# Patient Record
Sex: MALE | Race: WHITE | HISPANIC OR LATINO
[De-identification: names, ages, dates, MRNs, and addresses within clinical notes are randomized per-mention and may not be internally consistent; named-entity substitution may affect disease eponyms.]

---

## 2018-05-03 ENCOUNTER — TRANSCRIPTION ENCOUNTER (OUTPATIENT)
Age: 48
End: 2018-05-03

## 2021-12-20 PROBLEM — Z00.00 ENCOUNTER FOR PREVENTIVE HEALTH EXAMINATION: Status: ACTIVE | Noted: 2021-12-20

## 2023-05-14 ENCOUNTER — FORM ENCOUNTER (OUTPATIENT)
Age: 53
End: 2023-05-14

## 2023-05-15 ENCOUNTER — APPOINTMENT (OUTPATIENT)
Dept: MRI IMAGING | Facility: CLINIC | Age: 53
End: 2023-05-15
Payer: COMMERCIAL

## 2023-05-15 ENCOUNTER — APPOINTMENT (OUTPATIENT)
Dept: ORTHOPEDIC SURGERY | Facility: CLINIC | Age: 53
End: 2023-05-15
Payer: COMMERCIAL

## 2023-05-15 VITALS — HEIGHT: 68 IN | WEIGHT: 220 LBS | BODY MASS INDEX: 33.34 KG/M2

## 2023-05-15 DIAGNOSIS — I10 ESSENTIAL (PRIMARY) HYPERTENSION: ICD-10-CM

## 2023-05-15 DIAGNOSIS — Z78.9 OTHER SPECIFIED HEALTH STATUS: ICD-10-CM

## 2023-05-15 PROCEDURE — 73721 MRI JNT OF LWR EXTRE W/O DYE: CPT | Mod: RT

## 2023-05-15 PROCEDURE — 99203 OFFICE O/P NEW LOW 30 MIN: CPT

## 2023-05-15 PROCEDURE — 73590 X-RAY EXAM OF LOWER LEG: CPT | Mod: RT

## 2023-05-15 PROCEDURE — 73610 X-RAY EXAM OF ANKLE: CPT | Mod: RT

## 2023-05-15 RX ORDER — LOSARTAN POTASSIUM 50 MG/1
50 TABLET, FILM COATED ORAL
Refills: 0 | Status: ACTIVE | COMMUNITY

## 2023-05-15 NOTE — HISTORY OF PRESENT ILLNESS
[10] : 10 [7] : 7 [Localized] : localized [Sharp] : sharp [Full time] : Work status: full time [de-identified] : 5/15/23: 53YO M WITH RIGHT POSTERIOR ANKLE PAIN SINCE YESTERDAY, 5/14/23, AFTER HE HAD BEEN PLAYING HANDBALL. STATES HE FELT "AS IF SOMEONE HIT THE BACK OF HIS ANKLE WITH A ROCK" AND FELT A SNAP. HE WAS INITIALLY TREATED AT [facility] AND WAS PLACED IN A CAM BOOT AND CRUTCHES.  [] : Post Surgical Visit: no [FreeTextEntry1] : HORACE Jenkins [FreeTextEntry3] : 5/15/23 [FreeTextEntry5] : 51 y/o RHD M eval R Achilles s/p traumatic rupture while playing  handball yesterday as per [facility] ED on DOI.  [de-identified] : XR\par US [de-identified] : Sophia  [de-identified] : d

## 2023-05-15 NOTE — ASSESSMENT
[FreeTextEntry1] : Achilles tendon rupture after playing handball.  Advised a stat MRI and already in boot from the emergency room.  Added heel wedge to the boot.  To see Manny after MRI to discuss surgical repair.

## 2023-05-16 ENCOUNTER — TRANSCRIPTION ENCOUNTER (OUTPATIENT)
Age: 53
End: 2023-05-16

## 2023-05-17 ENCOUNTER — NON-APPOINTMENT (OUTPATIENT)
Age: 53
End: 2023-05-17

## 2023-05-17 ENCOUNTER — APPOINTMENT (OUTPATIENT)
Dept: ORTHOPEDIC SURGERY | Facility: CLINIC | Age: 53
End: 2023-05-17
Payer: COMMERCIAL

## 2023-05-17 VITALS — BODY MASS INDEX: 33.34 KG/M2 | WEIGHT: 220 LBS | HEIGHT: 68 IN

## 2023-05-17 DIAGNOSIS — S86.011A STRAIN OF RIGHT ACHILLES TENDON, INITIAL ENCOUNTER: ICD-10-CM

## 2023-05-17 PROCEDURE — 99214 OFFICE O/P EST MOD 30 MIN: CPT

## 2023-05-17 NOTE — HISTORY OF PRESENT ILLNESS
[10] : 10 [7] : 7 [Sharp] : sharp [de-identified] : 05/17/2023: injury 5/14 playing handball. saw dr traore who sent for mri. currently nwb in boot. no prior sig ankle probs. denies dm/tob. janelleman [] : Post Surgical Visit: no [FreeTextEntry1] : RT ankle

## 2023-05-17 NOTE — PHYSICAL EXAM
[Right] : right foot and ankle [Mild] : mild swelling over achilles tendon [5___] : [facility] 5[unfilled]/5 [2+] : dorsalis pedis pulse: 2+ [] : ambulation with crutches [FreeTextEntry9] : limited by pain [de-identified] : abnormal núñez

## 2023-05-17 NOTE — DATA REVIEWED
[Outside X-rays] : outside x-rays [Lower Extremities] : lower extremities [MRI] : MRI [Right] : of the right [Ankle] : ankle [I reviewed the films/CD and additionally noted] : I reviewed the films/CD and additionally noted [FreeTextEntry1] : no acute fx [FreeTextEntry2] : achilles rupture

## 2023-05-17 NOTE — ASSESSMENT
[FreeTextEntry1] : nwb in cam boot\par dsicussed surgical and non surgical options\par discussed pros/cons for both and expected recovery\par all questions answered and would like to proceed with achilles repair and posterior fasciotomy\par \par The pros, cons, risks, benefits, and alternatives have been discussed.  The risks include but are not limited to infection, bleeding, injury to small nerves and blood vessels, pain, stiffness, progression, rerupture, dvt, PE, amputation and death. All the patient's questions were answered and they would like to proceed.\par

## 2023-05-23 ENCOUNTER — OUTPATIENT (OUTPATIENT)
Dept: OUTPATIENT SERVICES | Facility: HOSPITAL | Age: 53
LOS: 1 days | End: 2023-05-23
Payer: COMMERCIAL

## 2023-05-23 VITALS
HEART RATE: 96 BPM | DIASTOLIC BLOOD PRESSURE: 80 MMHG | SYSTOLIC BLOOD PRESSURE: 120 MMHG | TEMPERATURE: 98 F | RESPIRATION RATE: 14 BRPM | OXYGEN SATURATION: 99 % | HEIGHT: 67 IN | WEIGHT: 225.97 LBS

## 2023-05-23 DIAGNOSIS — S86.019A STRAIN OF UNSPECIFIED ACHILLES TENDON, INITIAL ENCOUNTER: ICD-10-CM

## 2023-05-23 DIAGNOSIS — Z01.818 ENCOUNTER FOR OTHER PREPROCEDURAL EXAMINATION: ICD-10-CM

## 2023-05-23 DIAGNOSIS — S86.011A STRAIN OF RIGHT ACHILLES TENDON, INITIAL ENCOUNTER: ICD-10-CM

## 2023-05-23 LAB
ANION GAP SERPL CALC-SCNC: 10 MMOL/L — SIGNIFICANT CHANGE UP (ref 5–17)
BUN SERPL-MCNC: 15 MG/DL — SIGNIFICANT CHANGE UP (ref 7–23)
CALCIUM SERPL-MCNC: 9.4 MG/DL — SIGNIFICANT CHANGE UP (ref 8.4–10.5)
CHLORIDE SERPL-SCNC: 105 MMOL/L — SIGNIFICANT CHANGE UP (ref 96–108)
CO2 SERPL-SCNC: 26 MMOL/L — SIGNIFICANT CHANGE UP (ref 22–31)
CREAT SERPL-MCNC: 0.99 MG/DL — SIGNIFICANT CHANGE UP (ref 0.5–1.3)
EGFR: 91 ML/MIN/1.73M2 — SIGNIFICANT CHANGE UP
GLUCOSE SERPL-MCNC: 96 MG/DL — SIGNIFICANT CHANGE UP (ref 70–99)
HCT VFR BLD CALC: 45.5 % — SIGNIFICANT CHANGE UP (ref 39–50)
HGB BLD-MCNC: 15.8 G/DL — SIGNIFICANT CHANGE UP (ref 13–17)
MCHC RBC-ENTMCNC: 31.3 PG — SIGNIFICANT CHANGE UP (ref 27–34)
MCHC RBC-ENTMCNC: 34.7 GM/DL — SIGNIFICANT CHANGE UP (ref 32–36)
MCV RBC AUTO: 90.1 FL — SIGNIFICANT CHANGE UP (ref 80–100)
NRBC # BLD: 0 /100 WBCS — SIGNIFICANT CHANGE UP (ref 0–0)
PLATELET # BLD AUTO: 224 K/UL — SIGNIFICANT CHANGE UP (ref 150–400)
POTASSIUM SERPL-MCNC: 4 MMOL/L — SIGNIFICANT CHANGE UP (ref 3.5–5.3)
POTASSIUM SERPL-SCNC: 4 MMOL/L — SIGNIFICANT CHANGE UP (ref 3.5–5.3)
RBC # BLD: 5.05 M/UL — SIGNIFICANT CHANGE UP (ref 4.2–5.8)
RBC # FLD: 12.6 % — SIGNIFICANT CHANGE UP (ref 10.3–14.5)
SODIUM SERPL-SCNC: 141 MMOL/L — SIGNIFICANT CHANGE UP (ref 135–145)
WBC # BLD: 6.72 K/UL — SIGNIFICANT CHANGE UP (ref 3.8–10.5)
WBC # FLD AUTO: 6.72 K/UL — SIGNIFICANT CHANGE UP (ref 3.8–10.5)

## 2023-05-23 PROCEDURE — 80048 BASIC METABOLIC PNL TOTAL CA: CPT

## 2023-05-23 PROCEDURE — G0463: CPT

## 2023-05-23 PROCEDURE — 85027 COMPLETE CBC AUTOMATED: CPT

## 2023-05-23 PROCEDURE — 93010 ELECTROCARDIOGRAM REPORT: CPT

## 2023-05-23 PROCEDURE — 36415 COLL VENOUS BLD VENIPUNCTURE: CPT

## 2023-05-23 PROCEDURE — 93005 ELECTROCARDIOGRAM TRACING: CPT

## 2023-05-23 NOTE — H&P PST ADULT - MUSCULOSKELETAL COMMENTS
right leg on NWB in boot , tender on palpation right achilles tendon rupture right leg on NWB in boot ,right ankle  tender on palpation, no paresthesia

## 2023-05-23 NOTE — H&P PST ADULT - PROBLEM SELECTOR PLAN 1
scheduled for right achilles tendon repair on 5/31/23  will obtain medical clearance   pre op instructions   PCP needs to address EKG in the clearance

## 2023-05-23 NOTE — H&P PST ADULT - HISTORY OF PRESENT ILLNESS
This is a 51 y/o male who injured his right ankle while playing hand ball on 5/14/23 presents with complaint of right ankle pain , swelling NAW in boot went to ER  This is a 51 y/o male who injured his right ankle while playing hand ball on 5/14/23 presents with complaint of right ankle pain ,swelling NWB  in boot . scheduled for right achilles repair  on 5/31/23

## 2023-05-24 RX ORDER — CHLORHEXIDINE GLUCONATE 213 G/1000ML
1 SOLUTION TOPICAL ONCE
Refills: 0 | Status: DISCONTINUED | OUTPATIENT
Start: 2023-05-26 | End: 2023-06-09

## 2023-05-25 ENCOUNTER — TRANSCRIPTION ENCOUNTER (OUTPATIENT)
Age: 53
End: 2023-05-25

## 2023-05-26 ENCOUNTER — TRANSCRIPTION ENCOUNTER (OUTPATIENT)
Age: 53
End: 2023-05-26

## 2023-05-26 ENCOUNTER — APPOINTMENT (OUTPATIENT)
Dept: ORTHOPEDIC SURGERY | Facility: HOSPITAL | Age: 53
End: 2023-05-26
Payer: COMMERCIAL

## 2023-05-26 ENCOUNTER — OUTPATIENT (OUTPATIENT)
Dept: OUTPATIENT SERVICES | Facility: HOSPITAL | Age: 53
LOS: 1 days | End: 2023-05-26
Payer: COMMERCIAL

## 2023-05-26 VITALS
TEMPERATURE: 98 F | WEIGHT: 217.82 LBS | RESPIRATION RATE: 16 BRPM | OXYGEN SATURATION: 96 % | DIASTOLIC BLOOD PRESSURE: 95 MMHG | SYSTOLIC BLOOD PRESSURE: 125 MMHG | HEART RATE: 88 BPM | HEIGHT: 68 IN

## 2023-05-26 VITALS
HEART RATE: 75 BPM | OXYGEN SATURATION: 96 % | SYSTOLIC BLOOD PRESSURE: 128 MMHG | DIASTOLIC BLOOD PRESSURE: 80 MMHG | RESPIRATION RATE: 20 BRPM

## 2023-05-26 DIAGNOSIS — S86.011A STRAIN OF RIGHT ACHILLES TENDON, INITIAL ENCOUNTER: ICD-10-CM

## 2023-05-26 PROCEDURE — 27650 REPAIR ACHILLES TENDON: CPT | Mod: AS,RT

## 2023-05-26 PROCEDURE — C1713: CPT

## 2023-05-26 PROCEDURE — 97161 PT EVAL LOW COMPLEX 20 MIN: CPT

## 2023-05-26 PROCEDURE — 99261: CPT

## 2023-05-26 PROCEDURE — 27650 REPAIR ACHILLES TENDON: CPT | Mod: RT

## 2023-05-26 PROCEDURE — 27601 DECOMPRESSION OF LOWER LEG: CPT | Mod: 59,RT

## 2023-05-26 PROCEDURE — 29515 APPLICATION SHORT LEG SPLINT: CPT | Mod: 59,RT

## 2023-05-26 PROCEDURE — 27652 REPAIR/GRAFT ACHILLES TENDON: CPT | Mod: RT

## 2023-05-26 DEVICE — IMP SYS MIDSUBSTANCE ACHILLES SPEEDBRIDGE: Type: IMPLANTABLE DEVICE | Site: RIGHT | Status: FUNCTIONAL

## 2023-05-26 RX ORDER — ASPIRIN/CALCIUM CARB/MAGNESIUM 324 MG
1 TABLET ORAL
Qty: 30 | Refills: 0
Start: 2023-05-26

## 2023-05-26 RX ORDER — OXYCODONE HYDROCHLORIDE 5 MG/1
5 TABLET ORAL ONCE
Refills: 0 | Status: DISCONTINUED | OUTPATIENT
Start: 2023-05-26 | End: 2023-05-26

## 2023-05-26 RX ORDER — APREPITANT 80 MG/1
40 CAPSULE ORAL ONCE
Refills: 0 | Status: DISCONTINUED | OUTPATIENT
Start: 2023-05-26 | End: 2023-05-26

## 2023-05-26 RX ORDER — CEFAZOLIN SODIUM 1 G
2000 VIAL (EA) INJECTION ONCE
Refills: 0 | Status: COMPLETED | OUTPATIENT
Start: 2023-05-26 | End: 2023-05-26

## 2023-05-26 RX ORDER — HYDROMORPHONE HYDROCHLORIDE 2 MG/ML
0.5 INJECTION INTRAMUSCULAR; INTRAVENOUS; SUBCUTANEOUS
Refills: 0 | Status: DISCONTINUED | OUTPATIENT
Start: 2023-05-26 | End: 2023-05-26

## 2023-05-26 RX ORDER — HYDROCODONE BITARTRATE AND ACETAMINOPHEN 7.5; 325 MG/15ML; MG/15ML
1 SOLUTION ORAL
Qty: 20 | Refills: 0
Start: 2023-05-26

## 2023-05-26 RX ORDER — LOSARTAN POTASSIUM 100 MG/1
1 TABLET, FILM COATED ORAL
Refills: 0 | DISCHARGE

## 2023-05-26 RX ORDER — ONDANSETRON 8 MG/1
4 TABLET, FILM COATED ORAL ONCE
Refills: 0 | Status: DISCONTINUED | OUTPATIENT
Start: 2023-05-26 | End: 2023-05-26

## 2023-05-26 RX ORDER — SODIUM CHLORIDE 9 MG/ML
1000 INJECTION, SOLUTION INTRAVENOUS
Refills: 0 | Status: DISCONTINUED | OUTPATIENT
Start: 2023-05-26 | End: 2023-05-26

## 2023-05-26 RX ADMIN — SODIUM CHLORIDE 75 MILLILITER(S): 9 INJECTION, SOLUTION INTRAVENOUS at 12:22

## 2023-05-26 NOTE — PHYSICAL THERAPY INITIAL EVALUATION ADULT - NSACTIVITYREC_GEN_A_PT
Pt independent in transfers and amb with crutches, pt edu on stair negotiation simulation with crutches. Pt edu on NWB precautions. Pt issued crutches, adjusted to appropriate height. Pt issued NWB education handout. Pt cleared from PT services, no skilled need upon d/c.

## 2023-05-26 NOTE — PHYSICAL THERAPY INITIAL EVALUATION ADULT - RANGE OF MOTION EXAMINATION, REHAB EVAL
R ankle impairment secondary to surgery/bilateral upper extremity ROM was WFL (within functional limits)/Left LE ROM was WFL (within functional limits)/deficits as listed below

## 2023-05-26 NOTE — PHYSICAL THERAPY INITIAL EVALUATION ADULT - ADDITIONAL COMMENTS
Pt resides in pvt home with spouse and childre. Pt states is able to enter through back door without any stairs, denies stairs within household. Pt has one crutch, stating was using one crutch prior to surgery.

## 2023-05-26 NOTE — ASU PATIENT PROFILE, ADULT - FALL HARM RISK - UNIVERSAL INTERVENTIONS
Bed in lowest position, wheels locked, appropriate side rails in place/Call bell, personal items and telephone in reach/Instruct patient to call for assistance before getting out of bed or chair/Non-slip footwear when patient is out of bed/[place] to call system/Physically safe environment - no spills, clutter or unnecessary equipment/Purposeful Proactive Rounding/Room/bathroom lighting operational, light cord in reach

## 2023-05-26 NOTE — ASU DISCHARGE PLAN (ADULT/PEDIATRIC) - CARE PROVIDER_API CALL
Víctor Bryant  Orthopaedic Surgery  [address] [place] [address]  Phone: [phone]  Fax: [phone]  Follow Up Time: 1 week

## 2023-06-07 ENCOUNTER — APPOINTMENT (OUTPATIENT)
Dept: ORTHOPEDIC SURGERY | Facility: CLINIC | Age: 53
End: 2023-06-07
Payer: COMMERCIAL

## 2023-06-07 PROBLEM — I10 ESSENTIAL (PRIMARY) HYPERTENSION: Chronic | Status: ACTIVE | Noted: 2023-05-23

## 2023-06-07 PROCEDURE — 99024 POSTOP FOLLOW-UP VISIT: CPT

## 2023-06-07 NOTE — HISTORY OF PRESENT ILLNESS
[10] : 10 [7] : 7 [Sharp] : sharp [de-identified] : 05/17/2023: injury 5/14 playing handball. saw dr traore who sent for mri. currently nwb in boot. no prior sig ankle probs. denies dm/tob. doorman\par \par 5/26/2023:  R achilles repair deep posterior fasciotomy \par \par 06/07/2023:  no complaints. nwb in splint. taking asa. Patient denies fevers, chills, or drainage.\par  [] : Post Surgical Visit: no [FreeTextEntry1] : RT ankle

## 2023-06-07 NOTE — PHYSICAL EXAM
[Right] : right foot and ankle [5___] : [facility] 5[unfilled]/5 [2+] : dorsalis pedis pulse: 2+ [] : Sensation present to light touch in all distributions [de-identified] : rollator

## 2023-06-21 ENCOUNTER — APPOINTMENT (OUTPATIENT)
Dept: ORTHOPEDIC SURGERY | Facility: CLINIC | Age: 53
End: 2023-06-21
Payer: COMMERCIAL

## 2023-06-21 VITALS — HEIGHT: 68 IN | WEIGHT: 220 LBS | BODY MASS INDEX: 33.34 KG/M2

## 2023-06-21 PROCEDURE — 99024 POSTOP FOLLOW-UP VISIT: CPT

## 2023-06-21 NOTE — HISTORY OF PRESENT ILLNESS
[Sharp] : sharp [3] : 3 [0] : 0 [de-identified] : 05/17/2023: injury 5/14 playing handball. saw dr traore who sent for mri. currently nwb in boot. no prior sig ankle probs. denies dm/tob. doorman\par \par 5/26/2023:  R achilles repair deep posterior fasciotomy \par \par 06/07/2023:  no complaints. nwb in splint. taking asa. Patient denies fevers, chills, or drainage.\par \par 06/21/2023: improving. non compliant w/ weight bearing precautions. walking w/ full weight in boot using 1 crutch. Patient denies fevers, chills, or drainage.\par  [] : Post Surgical Visit: no [FreeTextEntry1] : RT ankle

## 2023-06-21 NOTE — PHYSICAL EXAM
[Right] : right foot and ankle [2+] : dorsalis pedis pulse: 2+ [] : no calf tenderness [5___] : plantar flexion 5[unfilled]/5 [FreeTextEntry3] : small superficial opening tuberosity incision -- no sign of infx [de-identified] : walking w/ 1 crutch

## 2023-06-21 NOTE — ASSESSMENT
[FreeTextEntry1] : progressive wb back to wbat in boot -- reiterated importance of precautions\par begin PT\par discussed wound care\par tylenol prn\par f/up 3 wks

## 2023-07-12 ENCOUNTER — APPOINTMENT (OUTPATIENT)
Dept: ORTHOPEDIC SURGERY | Facility: CLINIC | Age: 53
End: 2023-07-12
Payer: COMMERCIAL

## 2023-07-12 VITALS — HEIGHT: 68 IN | WEIGHT: 220 LBS | BODY MASS INDEX: 33.34 KG/M2

## 2023-07-12 PROCEDURE — 99024 POSTOP FOLLOW-UP VISIT: CPT

## 2023-07-12 NOTE — PHYSICAL EXAM
[Right] : right foot and ankle [5___] : [facility] 5[unfilled]/5 [2+] : dorsalis pedis pulse: 2+ [] : ambulation with crutches [TWNoteComboBox7] : dorsiflexion 10 degrees [de-identified] : plantar flexion 30 degrees

## 2023-07-12 NOTE — HISTORY OF PRESENT ILLNESS
[0] : 0 [Sharp] : sharp [1] : 2 [de-identified] : 05/17/2023: injury 5/14 playing handball. saw dr traore who sent for mri. currently nwb in boot. no prior sig ankle probs. denies dm/tob. doorman\par \par 5/26/2023:  R achilles repair deep posterior fasciotomy \par \par 06/07/2023:  no complaints. nwb in splint. taking asa. Patient denies fevers, chills, or drainage.\par \par 06/21/2023: improving. non compliant w/ weight bearing precautions. walking w/ full weight in boot using 1 crutch. Patient denies fevers, chills, or drainage.\par \par 07/12/2023: improving. wb in boot and crutches. going to PT. Patient denies fevers, chills, or drainage.\par  [] : Post Surgical Visit: no [FreeTextEntry1] : RT ankle

## 2023-08-16 ENCOUNTER — APPOINTMENT (OUTPATIENT)
Dept: ORTHOPEDIC SURGERY | Facility: CLINIC | Age: 53
End: 2023-08-16
Payer: COMMERCIAL

## 2023-08-16 VITALS — WEIGHT: 220 LBS | HEIGHT: 68 IN | BODY MASS INDEX: 33.34 KG/M2

## 2023-08-16 PROCEDURE — 99024 POSTOP FOLLOW-UP VISIT: CPT

## 2023-08-16 NOTE — ASSESSMENT
[FreeTextEntry1] : wbat transition out of boot rec return to PT no jumping/cutting may return to work f/up 6 wks

## 2023-08-16 NOTE — PHYSICAL EXAM
[Right] : right foot and ankle [5___] : [facility] 5[unfilled]/5 [2+] : dorsalis pedis pulse: 2+ [] : no achilles tendon tenderness [NL (40)] : plantar flexion 40 degrees [TWNoteComboBox7] : dorsiflexion 15 degrees

## 2023-08-16 NOTE — HISTORY OF PRESENT ILLNESS
[1] : 2 [0] : 0 [Sharp] : sharp [de-identified] : 05/17/2023: injury 5/14 playing handball. saw dr traore who sent for mri. currently nwb in boot. no prior sig ankle probs. denies dm/tob. janelleman  5/26/2023:  R achilles repair deep posterior fasciotomy   06/07/2023:  no complaints. nwb in splint. taking asa. Patient denies fevers, chills, or drainage.  06/21/2023: improving. non compliant w/ weight bearing precautions. walking w/ full weight in boot using 1 crutch. Patient denies fevers, chills, or drainage.  07/12/2023: improving. wb in boot and crutches. going to PT. Patient denies fevers, chills, or drainage.  08/16/2023: no complaints. wb in boot and crutches. only went to 3 sessions of PT. Patient denies fevers, chills, or drainage. [] : Post Surgical Visit: no [FreeTextEntry1] : RT ankle

## 2023-08-22 ENCOUNTER — EMERGENCY (EMERGENCY)
Facility: HOSPITAL | Age: 53
LOS: 1 days | Discharge: HOME | End: 2023-08-22
Attending: EMERGENCY MEDICINE | Admitting: EMERGENCY MEDICINE
Payer: COMMERCIAL

## 2023-08-22 VITALS
RESPIRATION RATE: 17 BRPM | OXYGEN SATURATION: 97 % | DIASTOLIC BLOOD PRESSURE: 82 MMHG | TEMPERATURE: 98 F | HEART RATE: 81 BPM | SYSTOLIC BLOOD PRESSURE: 131 MMHG

## 2023-08-22 VITALS
HEIGHT: 68 IN | OXYGEN SATURATION: 96 % | DIASTOLIC BLOOD PRESSURE: 95 MMHG | RESPIRATION RATE: 18 BRPM | HEART RATE: 88 BPM | SYSTOLIC BLOOD PRESSURE: 159 MMHG | TEMPERATURE: 98 F | WEIGHT: 219.8 LBS

## 2023-08-22 PROCEDURE — 99284 EMERGENCY DEPT VISIT MOD MDM: CPT | Mod: 25

## 2023-08-22 PROCEDURE — 99283 EMERGENCY DEPT VISIT LOW MDM: CPT | Mod: 25

## 2023-08-22 PROCEDURE — 29105 APPLICATION LONG ARM SPLINT: CPT | Mod: LT

## 2023-08-22 PROCEDURE — 73080 X-RAY EXAM OF ELBOW: CPT

## 2023-08-22 PROCEDURE — 73080 X-RAY EXAM OF ELBOW: CPT | Mod: 26,LT

## 2023-08-22 RX ORDER — IBUPROFEN 200 MG
600 TABLET ORAL ONCE
Refills: 0 | Status: COMPLETED | OUTPATIENT
Start: 2023-08-22 | End: 2023-08-22

## 2023-08-22 RX ADMIN — Medication 600 MILLIGRAM(S): at 22:02

## 2023-08-22 RX ADMIN — Medication 600 MILLIGRAM(S): at 22:17

## 2023-08-22 NOTE — ED PROVIDER NOTE - PATIENT PORTAL LINK FT
You can access the FollowMyHealth Patient Portal offered by [facility] by registering at the following website: [email or web address]. By joining [facility]’s FollowMyHealth portal, you will also be able to view your health information using other applications (apps) compatible with our system.

## 2023-08-22 NOTE — ED PROVIDER NOTE - MUSCULOSKELETAL MINIMAL EXAM
+ TTP left bicep insertion site with deformity. decreased ROM of elbow due to pain. no TTP left shoulder or left wrist with FROM. radial pulses equal and intact bilaterally.

## 2023-08-22 NOTE — ED ADULT NURSE NOTE - NSFALLUNIVINTERV_ED_ALL_ED
Bed/Stretcher in lowest position, wheels locked, appropriate side rails in place/Call bell, personal items and telephone in reach/Instruct patient to call for assistance before getting out of bed/chair/stretcher/Non-slip footwear applied when patient is off stretcher/[place] to call system/Physically safe environment - no spills, clutter or unnecessary equipment/Purposeful proactive rounding/Room/bathroom lighting operational, light cord in reach

## 2023-08-22 NOTE — ED PROCEDURE NOTE - ATTENDING APP SHARED VISIT CONTRIBUTION OF CARE
Mick Malone MD: I have personally performed a face to face diagnostic evaluation on this patient.  I have reviewed the PA note and agree with the history, exam, and plan of care, except as noted.  History and Exam by me shows same findings as documented    Splint applied

## 2023-08-22 NOTE — ED PROVIDER NOTE - NS ED ATTENDING STATEMENT MOD
This was a shared visit with the PATRIA. I reviewed and verified the documentation and independently performed the documented:

## 2023-08-22 NOTE — ED PROVIDER NOTE - ATTENDING APP SHARED VISIT CONTRIBUTION OF CARE
Mick Malone MD: I have personally performed a face to face diagnostic evaluation on this patient.  I have reviewed the PA note and agree with the history, exam, and plan of care, except as noted.  History and Exam by me shows same findings as documented

## 2023-08-22 NOTE — ED PROVIDER NOTE - OBJECTIVE STATEMENT
53-year-old male with history of hypertension presents to the ED complaining of left arm/elbow pain after  pulling up a shed door.  Patient felt sudden pain and a snap in his left elbow/arm.  Since then patient with pain especially with movement.  No fall to ground.  No additional injury.  Denies fever, chills, chest pain, shortness of breath, abdominal pain, upper or lower extremity weakness or paresthesias.

## 2023-08-22 NOTE — ED PROVIDER NOTE - CARE PROVIDER_API CALL
Antonio Aguilera  Orthopaedic Surgery  [address], [address]  [place] [address]  Phone: [phone]  Fax: [phone]  Follow Up Time: 1-3 Days

## 2023-08-22 NOTE — ED PROVIDER NOTE - NSFOLLOWUPINSTRUCTIONS_ED_ALL_ED_FT
Distal Biceps Tendon Tear    The distal biceps tendon is a strong cord of tissue that connects the biceps muscle—which is the muscle on the front of the upper arm—to a bone (radius) in the elbow. Distal biceps tendon tear is an injury that may include either of the following:    •A complete tear (rupture) in the tendon, causing the tendon to completely separate from the radius. When this happens, the biceps muscle pulls up (retracts) toward the shoulder.    •A partial tear in the tendon that causes the tendon to partially separate from the radius.    This condition can interfere with your ability to turn your hand palm-up (supination) and bend (flex) your elbow. It is often treated with surgery, and recovery may take 4–8 months.    What are the causes?  This condition is usually caused by suddenly straightening the elbow while the biceps muscle is tightened (contracted). This could happen, for example, while lifting or carrying a heavy object that suddenly falls or shifts position.    What increases the risk?  The following factors may make you more likely to develop this condition:    •Being a man who is 30–50 years old.  •Smoking.  •Using steroids.    What are the signs or symptoms?  Symptoms of this condition may include:    •A feeling like a snap or a pop in the elbow at the time of injury.  •Pain, swelling, and bruising in the front of the elbow.  •Weakness in the arm when doing certain movements, such as:  •Lifting or carrying objects  •Rotating the forearm, such as when you turn a screwdriver.  •Bending the elbow.  •A bulge in the upper arm. You may feel this in the front of the arm, the inside of the arm, or both.  •Limited range of motion of the elbow and forearm.    How is this diagnosed?  This condition may be diagnosed based on:  •Your symptoms and medical history.  •A physical exam. Your health care provider may test your range of motion by having you do arm movements.  •Tests, such as:  •X-rays.  •MRI.  •Ultrasound.    How is this treated?  Treatment for this condition may include:  •Medicines to help relieve pain and inflammation.  •A splint or brace to immobilize your elbow.  •Wearing a sling to help rest your arm.  •Resting from sports and intense physical activity.  •Surgery to reattach your tendon to your radius. This is the most common treatment.  •Physical therapy.    Follow these instructions at home:  If you have a splint, brace, or sling:   •Wear the splint, brace, or sling as told by your health care provider. Remove it only as told by your health care provider.  •Loosen the splint, brace, or sling if your fingers tingle, become numb, or turn cold and blue.  •Keep the splint, brace, or sling clean and dry.      Bathing   • Do not take baths, swim, or use a hot tub until your health care provider approves. Ask your health care provider if you may take showers. You may only be allowed to take sponge baths.  •If you have a splint, brace, or sling that is not waterproof:  •Do not let it get wet.  •Cover it with a watertight covering when you take a bath or shower.    Managing pain, stiffness, and swelling                 •If directed, put ice on the injured area:  •Put ice in a plastic bag.      •Place a towel between your skin and the bag.      •Leave the ice on for 20 minutes, 2–3 times a day.      •If directed, apply heat to the affected area before you exercise or as often as told by your health care provider. Use the heat source that your health care provider recommends, such as a moist heat pack or a heating pad.  •Place a towel between your skin and the heat source.      •Leave the heat on for 20–30 minutes.      •Remove the heat if your skin turns bright red. This is especially important if you are unable to feel pain, heat, or cold. You may have a greater risk of getting burned.        •Move your fingers often to avoid stiffness and to lessen swelling.      •Raise (elevate) the injured area above the level of your heart while you are sitting or lying down.      Activity     •Return to your normal activities as told by your health care provider. Ask your health care provider what activities are safe for you.    •Until your health care provider approves:  •Do not lift or carry anything with your injured arm.      •Do not use the affected limb to support your body weight.      •Do not participate in contact sports.        •Avoid activities that cause pain or make your condition worse.      •Do exercises as told by your physical therapist or health care provider.      General instructions     •Take over-the-counter and prescription medicines only as told by your health care provider.      •If you have a splint, do not put pressure on any part of the splint until it is fully hardened. This may take several hours.      •Ask your health care provider when it is safe to drive if you have a splint, brace, or sling on your arm.      • Do not use any products that contain nicotine or tobacco, such as cigarettes, e-cigarettes, and chewing tobacco. If you need help quitting, ask your health care provider.      •Keep all follow-up visits as told by your health care provider. This is important.        Contact a health care provider if:    •Your splint or brace causes pain or numbness.        Get help right away if:    •You develop severe pain.      •You develop numbness or tingling in your hand.      •Your hand feels unusually cold.      •Your fingernails turn a dark color, such as blue or gray.        Summary    •Distal biceps tendon tear is an injury that may involve a complete or partial tear of the tendon.      •This condition is usually caused by suddenly straightening the elbow while the biceps muscle is tightened.      •Treatment may include medicines, rest, physical therapy, immobilization, or surgery.      This information is not intended to replace advice given to you by your health care provider. Make sure you discuss any questions you have with your health care provider.

## 2023-08-22 NOTE — ED ADULT TRIAGE NOTE - CHIEF COMPLAINT QUOTE
I was pulling up the shed door and when I went to twist it I head something pop in the middle of my left arm at 2pm today; I can  move my hand but I cant lower my arm down straight without it hurting

## 2023-08-22 NOTE — ED PROVIDER NOTE - CLINICAL SUMMARY MEDICAL DECISION MAKING FREE TEXT BOX
Patient with arm pain after pulling heavy object. Has palpable defect in lateral head of biceps tendon at elbow. Will splint and refer to ortho

## 2023-08-22 NOTE — ED PROCEDURE NOTE - NS ED PERI VASCULAR NEG
fingers/toes warm to touch/no paresthesia/no swelling/no cyanosis of extremity/capillary refill time < 2 seconds
none
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
## 2023-08-25 ENCOUNTER — APPOINTMENT (OUTPATIENT)
Dept: ORTHOPEDIC SURGERY | Facility: CLINIC | Age: 53
End: 2023-08-25
Payer: COMMERCIAL

## 2023-08-25 ENCOUNTER — APPOINTMENT (OUTPATIENT)
Dept: MRI IMAGING | Facility: CLINIC | Age: 53
End: 2023-08-25
Payer: COMMERCIAL

## 2023-08-25 VITALS — HEIGHT: 68 IN | BODY MASS INDEX: 33.34 KG/M2 | WEIGHT: 220 LBS

## 2023-08-25 PROCEDURE — 99214 OFFICE O/P EST MOD 30 MIN: CPT

## 2023-08-25 PROCEDURE — 73221 MRI JOINT UPR EXTREM W/O DYE: CPT | Mod: LT

## 2023-08-25 NOTE — PHYSICAL EXAM
[] : tenderness over biceps tendon [Left] : left elbow [de-identified] : pain with resisted elbow flexion  [FreeTextEntry1] : [facility] 8/22/23: No visible fx

## 2023-08-25 NOTE — HISTORY OF PRESENT ILLNESS
[8] : 8 [0] : 0 [Dull/Aching] : dull/aching [Sharp] : sharp [Intermittent] : intermittent [Nothing helps with pain getting better] : Nothing helps with pain getting better [de-identified] : 53-year-old male presenting with a LEFT elbow injury. He was opening a shed door which was jammed shut. When he pulled the door open, he felt a pop in the elbow. He was seen at [facility] for x-rays which were negative. Presents in a long arm splint and sling.  DOI: 8/22/23  [] : no [FreeTextEntry1] : LT Elbow  [FreeTextEntry5] : 2 days ago, Pt was lifting a heavy shed door and heard a pop. Pt went to [facility] where X-ray were taken and came back negative for Fx.

## 2023-08-25 NOTE — DISCUSSION/SUMMARY
[de-identified] : Discussed the nature of the diagnosis and risk and benefits of different modalities of treatment. X-rays reviewed from [facility].  Suspicion for biceps tendon rupture.  Will obtain a STAT MRI to eval biceps tendon.  Discussed importance of obtaining additional imaging with little delay as there is a time sensitive issue if the tendon is ruptured Office will call to arrange follow after I see the MRI results.

## 2023-08-28 ENCOUNTER — APPOINTMENT (OUTPATIENT)
Dept: ORTHOPEDIC SURGERY | Facility: CLINIC | Age: 53
End: 2023-08-28
Payer: COMMERCIAL

## 2023-08-28 ENCOUNTER — APPOINTMENT (OUTPATIENT)
Dept: ORTHOPEDIC SURGERY | Facility: CLINIC | Age: 53
End: 2023-08-28

## 2023-08-28 VITALS — BODY MASS INDEX: 33.34 KG/M2 | WEIGHT: 220 LBS | HEIGHT: 68 IN

## 2023-08-28 DIAGNOSIS — S46.212A STRAIN OF MUSCLE, FASCIA AND TENDON OF OTHER PARTS OF BICEPS, LEFT ARM, INITIAL ENCOUNTER: ICD-10-CM

## 2023-08-28 PROCEDURE — 99214 OFFICE O/P EST MOD 30 MIN: CPT

## 2023-09-05 PROBLEM — S46.212A RUPTURE OF LEFT BICEPS TENDON, INITIAL ENCOUNTER: Status: ACTIVE | Noted: 2023-08-25

## 2023-09-05 NOTE — DISCUSSION/SUMMARY
[de-identified] : Assessment:   The patient presents with history, examination and imaging that are most consistent with a diagnosis of: distal bicep tear  We discussed further treatment options, including continuing conservative measures versus surgical intervention. The risks, benefits and alternatives of the proposed procedure were discussed, along with the expected post-operative recovery and potential complications.  We did discuss the benefits and risks to surgical versus nonsurgical treatment options. Given this is his dominant arm, I did recommend surgical fixation to restore his function and better predict is strength outcome postoperatively. I did discuss with him that he will likely continue to have a cosmetic deformity in the right arm if he pursued no surgery, and would likely notice some weakness with supination and elbow flexion moving forward despite no surgery.  He did endorse understanding all of these risks and benefits, and would like to pursue nonoperative management at this time. I did discuss with him that if he waits longer than 4 to 6 weeks to decide on surgery, a later surgery to restore his distal tendon of the biceps may necessitate a reconstructive option which would include cadaveric tissue. He did understand this.   Prior to appointment and during encounter with patient extensive medical records were reviewed including but not limited to, hospital records, out patient records, imaging results, and lab data. During this appointment the patient was examined, diagnoses were discussed and explained in a face to face manner. In addition extensive time was spent reviewing aforementioned diagnostic studies. Counseling including abnormal image results, differential diagnoses, treatment options, risk and benefits, lifestyle changes, current condition, and current medications was performed. Patient's comments, questions, and concerns were address and patient verbalized understanding.  ---------------------------     Plan:    Counseling:     - Patient recommended to modify their activities until symptoms resolve.     Physical Therapy:    - Referral placed to ambulatory physical therapy.    - Therapy protocol provided to guide the treating therapist.    - Patient to schedule therapy session at their earliest convenience.    - Home exercise program from [facility] provided to patient as an alternative.  NSAIDs:     - Medication script sent to the patients preferred pharmacy.    - Patient was instructed to take this medication with food on an as needed basis.    - Patient educated on the gastrointestinal side effects with excessive use.   The patient's current medication management of their orthopedic diagnosis was reviewed today:   (1) We discussed a comprehensive treatment plan that included possible pharmaceutical management involving the use of prescription strength medications including but not limited to options such as oral Naprosyn 500mg BID, once daily Meloxicam 15 mg, or 500-650 mg Tylenol versus over the counter oral medications and topical prescription NSAID Pennsaid vs over the counter Voltaren gel.   (2) There is a moderate risk of morbidity with further treatment, especially from use of prescription strength medications and possible side effects of these medications which include upset stomach with oral medications, skin reactions to topical medications and cardiac/renal issues with long term use.   (3) I recommended that the patient follow-up with their medical physician to discuss any significant specific potential issues with long term medication use such as interactions with current medications or with exacerbation of underlying medical comorbidities.   (4) The benefits and risks associated with use of injectable, oral or topical, prescription and over the counter anti-inflammatory medications were discussed with the patient. The patient voiced understanding of the risks including but not limited to bleeding, stroke, kidney dysfunction, heart disease, and were referred to the black box warning label for further information.    Follow-up:  2 weeks

## 2023-09-05 NOTE — HISTORY OF PRESENT ILLNESS
[0] : 0 [FreeTextEntry5] : feeling better better rom & able to squeeze without pain [de-identified] : no treatment at this time  [de-identified] : The patient is a 53 year old  RHD M who presents today complaining of left elbow pain.   Date of Injury/Onset: 8/22/23 Pain:    At Rest: 1/10  With Activity:  6/10  Mechanism of injury: Doing yardwork cleaning, went to lift the shed door and felt a strain. This is [not] a Work-Related Injury being treated under Worker's Compensation. This is [not] an athletic injury occurring associated with an interscholastic or organized sports team. 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

## 2023-09-05 NOTE — HISTORY OF PRESENT ILLNESS
[0] : 0 [FreeTextEntry5] : feeling better better rom & able to squeeze without pain [de-identified] : no treatment at this time  [de-identified] : The patient is a 53 year old  RHD M who presents today complaining of left elbow pain.   Date of Injury/Onset: 8/22/23 Pain:    At Rest: 1/10  With Activity:  6/10  Mechanism of injury: Doing yardwork cleaning, went to lift the shed door and felt a strain. This is [not] a Work-Related Injury being treated under Worker's Compensation. This is [not] an athletic injury occurring associated with an interscholastic or organized sports team. 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

## 2023-09-05 NOTE — DISCUSSION/SUMMARY
[de-identified] : Assessment:   The patient presents with history, examination and imaging that are most consistent with a diagnosis of: distal bicep tear  We discussed further treatment options, including continuing conservative measures versus surgical intervention. The risks, benefits and alternatives of the proposed procedure were discussed, along with the expected post-operative recovery and potential complications.  We did discuss the benefits and risks to surgical versus nonsurgical treatment options. Given this is his dominant arm, I did recommend surgical fixation to restore his function and better predict is strength outcome postoperatively. I did discuss with him that he will likely continue to have a cosmetic deformity in the right arm if he pursued no surgery, and would likely notice some weakness with supination and elbow flexion moving forward despite no surgery.  He did endorse understanding all of these risks and benefits, and would like to pursue nonoperative management at this time. I did discuss with him that if he waits longer than 4 to 6 weeks to decide on surgery, a later surgery to restore his distal tendon of the biceps may necessitate a reconstructive option which would include cadaveric tissue. He did understand this.   Prior to appointment and during encounter with patient extensive medical records were reviewed including but not limited to, hospital records, out patient records, imaging results, and lab data. During this appointment the patient was examined, diagnoses were discussed and explained in a face to face manner. In addition extensive time was spent reviewing aforementioned diagnostic studies. Counseling including abnormal image results, differential diagnoses, treatment options, risk and benefits, lifestyle changes, current condition, and current medications was performed. Patient's comments, questions, and concerns were address and patient verbalized understanding.  ---------------------------     Plan:    Counseling:     - Patient recommended to modify their activities until symptoms resolve.     Physical Therapy:    - Referral placed to ambulatory physical therapy.    - Therapy protocol provided to guide the treating therapist.    - Patient to schedule therapy session at their earliest convenience.    - Home exercise program from [facility] provided to patient as an alternative.  NSAIDs:     - Medication script sent to the patients preferred pharmacy.    - Patient was instructed to take this medication with food on an as needed basis.    - Patient educated on the gastrointestinal side effects with excessive use.   The patient's current medication management of their orthopedic diagnosis was reviewed today:   (1) We discussed a comprehensive treatment plan that included possible pharmaceutical management involving the use of prescription strength medications including but not limited to options such as oral Naprosyn 500mg BID, once daily Meloxicam 15 mg, or 500-650 mg Tylenol versus over the counter oral medications and topical prescription NSAID Pennsaid vs over the counter Voltaren gel.   (2) There is a moderate risk of morbidity with further treatment, especially from use of prescription strength medications and possible side effects of these medications which include upset stomach with oral medications, skin reactions to topical medications and cardiac/renal issues with long term use.   (3) I recommended that the patient follow-up with their medical physician to discuss any significant specific potential issues with long term medication use such as interactions with current medications or with exacerbation of underlying medical comorbidities.   (4) The benefits and risks associated with use of injectable, oral or topical, prescription and over the counter anti-inflammatory medications were discussed with the patient. The patient voiced understanding of the risks including but not limited to bleeding, stroke, kidney dysfunction, heart disease, and were referred to the black box warning label for further information.    Follow-up:  2 weeks

## 2023-09-05 NOTE — DATA REVIEWED
[I reviewed the films/CD and agree] : I reviewed the films/CD and agree [MRI] : MRI [Left] : left [Elbow] : elbow [Report was reviewed and noted in the chart] : The report was reviewed and noted in the chart [I independently reviewed and interpreted images and report] : I independently reviewed and interpreted images and report [FreeTextEntry1] : MRI LEFT ELBOW [facility] 08/25/2023: high grade tear of distal biceps involving more than 90% of insertion

## 2023-09-05 NOTE — PHYSICAL EXAM
[Normal Coordination] : normal coordination [Normal DTR UE/LE] : normal DTR UE/LE  [Normal Sensation] : normal sensation [Normal Mood and Affect] : normal mood and affect [Orientated] : orientated [Normal Skin] : normal skin [No Rash] : no rash [No Ulcers] : no ulcers [No Lesions] : no lesions [No obvious lymphadenopathy in areas examined] : no obvious lymphadenopathy in areas examined [NL (150)] : flexion 150 degrees [Pain with Flexion] : pain with flexion [NL (0)] : extension 0 degrees [NL (90)] : supination 90 degrees [Pain with Supination] : pain with supination [5___] : supination 5[unfilled]/5 [Left] : left elbow [No acute displaced fracture or dislocation] : No acute displaced fracture or dislocation [] : negative milking

## 2023-09-27 ENCOUNTER — APPOINTMENT (OUTPATIENT)
Dept: ORTHOPEDIC SURGERY | Facility: CLINIC | Age: 53
End: 2023-09-27
Payer: COMMERCIAL

## 2023-09-27 VITALS — WEIGHT: 220 LBS | BODY MASS INDEX: 33.34 KG/M2 | HEIGHT: 68 IN

## 2023-09-27 DIAGNOSIS — S86.011D STRAIN OF RIGHT ACHILLES TENDON, SUBSEQUENT ENCOUNTER: ICD-10-CM

## 2023-09-27 PROCEDURE — 99213 OFFICE O/P EST LOW 20 MIN: CPT

## 2023-10-02 ENCOUNTER — APPOINTMENT (OUTPATIENT)
Dept: ORTHOPEDIC SURGERY | Facility: CLINIC | Age: 53
End: 2023-10-02

## 2023-11-01 ENCOUNTER — APPOINTMENT (OUTPATIENT)
Dept: ORTHOPEDIC SURGERY | Facility: CLINIC | Age: 53
End: 2023-11-01

## 2024-01-17 ENCOUNTER — APPOINTMENT (OUTPATIENT)
Dept: ORTHOPEDIC SURGERY | Facility: CLINIC | Age: 54
End: 2024-01-17

## 2024-09-24 NOTE — ASU PATIENT PROFILE, ADULT - PRO ARRIVE FROM
How Severe Is Your Condition?: mild Additional History: Discussed with patient if it is still there in one month he is to come in for a biopsy. home

## (undated) DEVICE — SUT POLYSORB 0 36" GS-21 UNDYED

## (undated) DEVICE — SUT FIBERWIRE #2 38" STRAND 1 BLUE 1 WHITE/BLACK

## (undated) DEVICE — SUT WIRE # 2 TIGERLOOP

## (undated) DEVICE — WARMING BLANKET UPPER ADULT

## (undated) DEVICE — DRSG WEBRIL 6"

## (undated) DEVICE — DRSG XEROFORM 1 X 8"

## (undated) DEVICE — POSITIONER FOAM HEAD CRADLE (PINK)

## (undated) DEVICE — LAP PAD 18 X 18"

## (undated) DEVICE — SUT ETHIBOND 2-0 30" SH

## (undated) DEVICE — SUT NDL REGULAR SURGEON 1/2 CIRCLE REVERSE CUTTING 0.055" X 2.953"

## (undated) DEVICE — SUT PDS II 0 36" CT-1

## (undated) DEVICE — GLV 8 PROTEXIS (BLUE)

## (undated) DEVICE — TOURNIQUET ESMARK 6"

## (undated) DEVICE — SOLIDIFIER CANN EXPRESS 3K

## (undated) DEVICE — SUT MONOSOF 3-0 18" PC-12

## (undated) DEVICE — SPECIMEN CONTAINER 100ML

## (undated) DEVICE — DRSG CURITY GAUZE SPONGE 4 X 4" 12-PLY

## (undated) DEVICE — SUT POLYSORB 0 30" GS-10 UNDYED

## (undated) DEVICE — SUT POLYSORB 2-0 30" C-14 UNDYED

## (undated) DEVICE — SUT MONOSOF 3-0 18" C-14

## (undated) DEVICE — CANISTER SUCTION LID GUARD 3000CC

## (undated) DEVICE — POSITIONER STRAP ARMBOARD VELCRO TS-30

## (undated) DEVICE — BLADE SCALPEL SAFETYLOCK #15

## (undated) DEVICE — VENODYNE/SCD SLEEVE CALF MEDIUM

## (undated) DEVICE — BLADE SCALPEL SAFETYLOCK #10

## (undated) DEVICE — SOL IRR POUR H2O 1500ML

## (undated) DEVICE — SUT POLYSORB 2-0 30" V-20 UNDYED

## (undated) DEVICE — SOL IRR POUR NS 0.9% 500ML

## (undated) DEVICE — PACK LOWER EXTREMITY

## (undated) DEVICE — SUT FIBERWIRE #2 38" STRAND 1 BLUE T-5 TAPER

## (undated) DEVICE — DRSG ACE BANDAGE 6"